# Patient Record
(demographics unavailable — no encounter records)

---

## 2025-02-25 NOTE — CONSULT LETTER
[Dear  ___] : Dear ~KELLY, [Consult Letter:] : I had the pleasure of evaluating your patient, [unfilled]. [Please see my note below.] : Please see my note below. [Consult Closing:] : Thank you very much for allowing me to participate in the care of this patient.  If you have any questions, please do not hesitate to contact me. [Sincerely,] : Sincerely, [FreeTextEntry3] : Allison Johnson MD Department of Dermatology [facility]

## 2025-02-25 NOTE — HISTORY OF PRESENT ILLNESS
[FreeTextEntry1] : np rash [de-identified] : Referred by: Helen Jose Mr. VESTA OLIVA  is a 6 year old M here for evaluation of below  #1 month ago got a bumpy rash, got mupirocin which helped. was clear but recurred about 1 week, started mupirocin again  S: unsure M: O keashley, baby vaseline D: regular  Derm Hx: eczema. pt does not like moisturizing ointments on his skin no personal or family h/o skin cancer

## 2025-02-25 NOTE — ASSESSMENT
[Use of independent historian: [ enter independent historian's relationship to patient ] :____] : As the patient was unable to provide a complete and reliable history, I obtained clinical history from the patient's [unfilled] [FreeTextEntry1] : #rash no signs of active infection at present, though pt is using mupirocin so its possible there is a resolving infection ok to continue mupirocin BID for another 5 days  #eczematous rash on R arm ddx fungal vs eczema f/u fungal cx in 5 days, start ketoconazole BID to AA  if no improvement in 2 weeks would switch to TAC  #atopic dermatitis, flaring today on hands with #xerosis discussed nature, chronicity and unpredictable course. dry skin care reviewed, handout provided. Switch to recommended products in handout and moisturize liberally. start triamcinolone 0.1% ointment BID to AA on body PRN roughness, SED start cln body wash RTC 3 mo

## 2025-02-25 NOTE — PHYSICAL EXAM
[Alert] : alert [FreeTextEntry3] : eczematous plaques on hands, arms red scaly plaque on upper right arm and adjacent pink papules on R upper chest xerosis

## 2025-07-23 NOTE — CONSULT LETTER
[Dear  ___] : Dear ~KELLY, [Consult Letter:] : I had the pleasure of evaluating your patient, [unfilled]. [Please see my note below.] : Please see my note below. [Consult Closing:] : Thank you very much for allowing me to participate in the care of this patient.  If you have any questions, please do not hesitate to contact me. [Sincerely,] : Sincerely, [FreeTextEntry3] : Allison Johnson MD Department of Dermatology [facility]

## 2025-07-23 NOTE — HISTORY OF PRESENT ILLNESS
[FreeTextEntry1] : fp rash [de-identified] : Referred by: Helen Jose Mr. VESTA OLIVA  is a 6 year old M here w/ mom and brothers for evaluation of below  #rash on body x yrs, spreading. asx. mom thinks dad is applying TAC once a day when needed but not helping much denies joint pains has maternal aunt w/ PsO  S: unsure M: O keefes, baby vaseline D: regular  Derm Hx: eczema. pt does not like moisturizing ointments on his skin no personal or family h/o skin cancer

## 2025-07-23 NOTE — ASSESSMENT
[Use of independent historian: [ enter independent historian's relationship to patient ] :____] : As the patient was unable to provide a complete and reliable history, I obtained clinical history from the patient's [unfilled] [FreeTextEntry1] : #favor PsO with #xerosis not pruritic flaring no joint involvement ~2% BSA ddx less likely eczematous  discussed nature, chronicity and unpredictable course. Therapeutic options and their risks and benefits; along with multiple diagnostic possibilities were discussed at length; risks and benefits of further study were discussed dry skin care reviewed, handout provided. Switch to recommended products in handout and moisturize liberally. start zoryve cream daily if covered, SED otherwise start betamethasone ointment BID to AA on body PRN roughness, SED  RTC 3 mo

## 2025-07-23 NOTE — CONSULT LETTER
[Dear  ___] : Dear ~KELLY, [Consult Letter:] : I had the pleasure of evaluating your patient, [unfilled]. [Please see my note below.] : Please see my note below. [Consult Closing:] : Thank you very much for allowing me to participate in the care of this patient.  If you have any questions, please do not hesitate to contact me. [Sincerely,] : Sincerely, [FreeTextEntry3] : Allison Johnson MD Department of Dermatology [facility]

## 2025-07-23 NOTE — PHYSICAL EXAM
[Alert] : alert [FreeTextEntry3] : multiple pink scaly round plaques on lower legs >  few on arms and dorsal hands xerosis no nail pitting scalp clear groin clear

## 2025-07-23 NOTE — HISTORY OF PRESENT ILLNESS
[FreeTextEntry1] : fp rash [de-identified] : Referred by: Helen Jose Mr. VESTA OLIVA  is a 6 year old M here w/ mom and brothers for evaluation of below  #rash on body x yrs, spreading. asx. mom thinks dad is applying TAC once a day when needed but not helping much denies joint pains has maternal aunt w/ PsO  S: unsure M: O keefes, baby vaseline D: regular  Derm Hx: eczema. pt does not like moisturizing ointments on his skin no personal or family h/o skin cancer